# Patient Record
Sex: FEMALE | Race: WHITE
[De-identification: names, ages, dates, MRNs, and addresses within clinical notes are randomized per-mention and may not be internally consistent; named-entity substitution may affect disease eponyms.]

---

## 2019-11-20 ENCOUNTER — HOSPITAL ENCOUNTER (OUTPATIENT)
Dept: HOSPITAL 44 - OPSURG | Age: 64
Discharge: HOME | End: 2019-11-20
Attending: SPECIALIST
Payer: MEDICARE

## 2019-11-20 DIAGNOSIS — M99.06: ICD-10-CM

## 2019-11-20 DIAGNOSIS — M70.61: ICD-10-CM

## 2019-11-20 DIAGNOSIS — M47.26: ICD-10-CM

## 2019-11-20 DIAGNOSIS — M47.812: ICD-10-CM

## 2019-11-20 DIAGNOSIS — M99.02: ICD-10-CM

## 2019-11-20 DIAGNOSIS — M99.15: Primary | ICD-10-CM

## 2019-11-20 PROCEDURE — 27198 CLSD TX PELVIC RING FX: CPT

## 2019-11-20 PROCEDURE — 22505 MANIPULATION OF SPINE: CPT

## 2019-11-20 PROCEDURE — 27275 MANIPULATION OF HIP JOINT: CPT

## 2019-11-21 ENCOUNTER — HOSPITAL ENCOUNTER (OUTPATIENT)
Dept: HOSPITAL 44 - OPSURG | Age: 64
Discharge: HOME | End: 2019-11-21
Attending: SPECIALIST
Payer: MEDICARE

## 2019-11-21 DIAGNOSIS — M47.812: ICD-10-CM

## 2019-11-21 DIAGNOSIS — M99.06: ICD-10-CM

## 2019-11-21 DIAGNOSIS — M70.61: ICD-10-CM

## 2019-11-21 DIAGNOSIS — M99.15: Primary | ICD-10-CM

## 2019-11-21 DIAGNOSIS — M47.26: ICD-10-CM

## 2019-11-21 DIAGNOSIS — M99.02: ICD-10-CM

## 2019-11-21 PROCEDURE — 27275 MANIPULATION OF HIP JOINT: CPT

## 2019-11-21 PROCEDURE — 22505 MANIPULATION OF SPINE: CPT

## 2019-11-21 PROCEDURE — 27198 CLSD TX PELVIC RING FX: CPT

## 2019-11-22 ENCOUNTER — HOSPITAL ENCOUNTER (OUTPATIENT)
Dept: HOSPITAL 44 - OPSURG | Age: 64
Discharge: HOME | End: 2019-11-22
Attending: SPECIALIST
Payer: MEDICARE

## 2019-11-22 DIAGNOSIS — M99.02: ICD-10-CM

## 2019-11-22 DIAGNOSIS — M47.26: ICD-10-CM

## 2019-11-22 DIAGNOSIS — M70.61: ICD-10-CM

## 2019-11-22 DIAGNOSIS — M99.15: Primary | ICD-10-CM

## 2019-11-22 DIAGNOSIS — M47.812: ICD-10-CM

## 2019-11-22 DIAGNOSIS — M99.06: ICD-10-CM

## 2019-11-22 PROCEDURE — 27198 CLSD TX PELVIC RING FX: CPT

## 2019-11-22 PROCEDURE — 27275 MANIPULATION OF HIP JOINT: CPT

## 2019-11-22 PROCEDURE — 22505 MANIPULATION OF SPINE: CPT

## 2023-04-11 ENCOUNTER — APPOINTMENT (RX ONLY)
Dept: URBAN - METROPOLITAN AREA CLINIC 71 | Facility: CLINIC | Age: 68
Setting detail: DERMATOLOGY
End: 2023-04-11

## 2023-04-11 DIAGNOSIS — L82.1 OTHER SEBORRHEIC KERATOSIS: ICD-10-CM

## 2023-04-11 DIAGNOSIS — Z71.89 OTHER SPECIFIED COUNSELING: ICD-10-CM

## 2023-04-11 DIAGNOSIS — D485 NEOPLASM OF UNCERTAIN BEHAVIOR OF SKIN: ICD-10-CM

## 2023-04-11 PROBLEM — D23.71 OTHER BENIGN NEOPLASM OF SKIN OF RIGHT LOWER LIMB, INCLUDING HIP: Status: ACTIVE | Noted: 2023-04-11

## 2023-04-11 PROBLEM — D48.5 NEOPLASM OF UNCERTAIN BEHAVIOR OF SKIN: Status: ACTIVE | Noted: 2023-04-11

## 2023-04-11 PROCEDURE — ? PRESCRIPTION

## 2023-04-11 PROCEDURE — 99203 OFFICE O/P NEW LOW 30 MIN: CPT

## 2023-04-11 PROCEDURE — ? COUNSELING

## 2023-04-11 PROCEDURE — ? TREATMENT REGIMEN

## 2023-04-11 RX ORDER — HYDROCORTISONE 25 MG/G
CREAM TOPICAL BID
Qty: 30 | Refills: 0 | Status: ERX | COMMUNITY
Start: 2023-04-11

## 2023-04-11 RX ADMIN — HYDROCORTISONE: 25 CREAM TOPICAL at 00:00

## 2023-04-11 ASSESSMENT — LOCATION SIMPLE DESCRIPTION DERM
LOCATION SIMPLE: RIGHT UPPER BACK
LOCATION SIMPLE: RIGHT FOREARM
LOCATION SIMPLE: CHIN

## 2023-04-11 ASSESSMENT — LOCATION ZONE DERM
LOCATION ZONE: ARM
LOCATION ZONE: FACE
LOCATION ZONE: TRUNK

## 2023-04-11 ASSESSMENT — LOCATION DETAILED DESCRIPTION DERM
LOCATION DETAILED: LEFT CHIN
LOCATION DETAILED: RIGHT DISTAL DORSAL FOREARM
LOCATION DETAILED: RIGHT INFERIOR MEDIAL UPPER BACK

## 2023-04-11 NOTE — PROCEDURE: TREATMENT REGIMEN
Initiate Treatment: Hydrocortisone 2.5% cream BID x 2 weeks to AA on chin. Advised for patient not to pick at lesions
Detail Level: Zone
Plan: Will re-check in 2 weeks

## 2024-04-16 NOTE — PROCEDURE: MIPS QUALITY
Quality 431: Preventive Care And Screening: Unhealthy Alcohol Use - Screening: Patient not identified as an unhealthy alcohol user when screened for unhealthy alcohol use using a systematic screening method
Detail Level: Detailed
Quality 226: Preventive Care And Screening: Tobacco Use: Screening And Cessation Intervention: Patient screened for tobacco use and is an ex/non-smoker
Quality 130: Documentation Of Current Medications In The Medical Record: Current Medications Documented
None